# Patient Record
Sex: FEMALE | Race: OTHER
[De-identification: names, ages, dates, MRNs, and addresses within clinical notes are randomized per-mention and may not be internally consistent; named-entity substitution may affect disease eponyms.]

---

## 2017-04-24 ENCOUNTER — HOSPITAL ENCOUNTER (EMERGENCY)
Dept: HOSPITAL 74 - JERFT | Age: 22
Discharge: HOME | End: 2017-04-24
Payer: COMMERCIAL

## 2017-04-24 VITALS — DIASTOLIC BLOOD PRESSURE: 65 MMHG | TEMPERATURE: 98.1 F | SYSTOLIC BLOOD PRESSURE: 126 MMHG | HEART RATE: 67 BPM

## 2017-04-24 VITALS — BODY MASS INDEX: 37.9 KG/M2

## 2017-04-24 DIAGNOSIS — N93.8: Primary | ICD-10-CM

## 2017-04-24 LAB
APPEARANCE UR: CLEAR
BILIRUB UR STRIP.AUTO-MCNC: NEGATIVE MG/DL
COLOR UR: (no result)
KETONES UR QL STRIP: NEGATIVE
LEUKOCYTE ESTERASE UR QL STRIP.AUTO: NEGATIVE
NITRITE UR QL STRIP: NEGATIVE
PH UR: 8 [PH] (ref 5–8)
PROT UR QL STRIP: NEGATIVE
PROT UR QL STRIP: NEGATIVE
RBC # UR STRIP: NEGATIVE /UL
SP GR UR: 1.01 (ref 1–1.03)
UROBILINOGEN UR STRIP-MCNC: NEGATIVE E.U./DL (ref 0.2–1)

## 2017-04-24 NOTE — PDOC
History of Present Illness





- General


Chief Complaint: Pain


Stated Complaint: Abd cramps x2 weeks


Time Seen by Provider: 17 14:44


History Source: Patient


Exam Limitations: No Limitations





- History of Present Illness


Initial Comments: 


17 17:09














My Chief complaint: Lower  Abdominal cramping intermittently











History of present illness: Patient is a 22-year-old female with a history of 

ovarian cysts, ectopic pregnancy a , second trimester miscarriage at 2015, blanted ovum 2015 and one  here today of lower abdominal 

cramping that started in the beginning of the month lasting 3-4 days after her 

menstrual cycle  and reocurring 17 that are intermittent worse on the left 

side and slight lower back pain intermittently. Patient denies any urinary 

symptoms. Patient reports being sexually active however does not use condoms. 

Patient had been on birth control however has not taken for 2 months. Patient 

denies any fever, nausea, vomiting, or any vaginal discharge. Patient 

requesting pregnancy test. Denies any radiation of pain down the legs from back 

or any urinary incontinency or saddle anesthesia.





17 17:17





Timing/Duration: intermittent


Severity: moderate


Associated Symptoms: reports: denies symptoms





Past History





- Past Medical History


Allergies/Adverse Reactions: 


 Allergies











Allergy/AdvReac Type Severity Reaction Status Date / Time


 


No Known Allergies Allergy   Verified 17 14:10











Home Medications: 


Ambulatory Orders





No Home Medications 0 dose .ROUTE UTDICT 13 








Other medical history: ovarian cyst





- Reproductive History


 (#): 2


Para: 0


Therapeutic (s) & number: Yes





- Psycho/Social/Smoking Cessation Hx


Suicidal Ideation: No


Smoking Status: No


Smoking History: Current every day smoker


Number of Cigarettes Smoked Daily: 5


Information on smoking cessation initiated: Yes


'Breaking Loose' booklet given: 17


Hx Alcohol Use: Yes


Drug/Substance Use Hx: No


Substance Use Type: None





**Review of Systems





- Review of Systems


Able to Perform ROS?: Yes


Constitutional: No: Symptoms Reported


HEENTM: No: Symptoms Reported


Respiratory: No: Symptoms reported


Cardiac (ROS): No: Symptoms Reported


ABD/GI: Yes: Abdominal cramping (intermitently last 3-4 days ).  No: Constipated

, Diarrhea, Nausea


Musculoskeletal: Yes: Back Pain (minimal lower back pain )


Integumentary: No: Symptoms Reported


Neurological: No: Symptoms reported





*Physical Exam





- Vital Signs


 Last Vital Signs











Temp Pulse Resp BP Pulse Ox


 


 98.1 F   67   18   126/65   98 


 


 17 14:07  17 14:07  17 14:07  17 14:07  17 14:07














- Physical Exam


General Appearance: Yes: Appropriately Dressed


Respiratory/Chest: positive: Lungs Clear, Normal Breath Sounds.  negative: 

Chest Tender, Respiratory Distress


Cardiovascular: positive: Regular Rhythm, Regular Rate, S1, S2


Female Pelvic Exam: positive: normal external exam, cervical os closed, normal 

adnexa, normal size ovaries, vaginal bleeding.  negative: CMT, discharge, 

lesions, Bartholin mass, Scalene Gland, adnexal tenderness, uterus, Urethra


Gastrointestinal/Abdominal: positive: Normal Bowel Sounds, Soft, Tenderness (

minimal mid suprapubic, left sided ).  negative: Organomegaly, Distended, 

Guarding, Rebound, Hepatomegaly, Spleenomegaly


Musculoskeletal: positive: Normal Inspection.  negative: CVA Tenderness, CVA 

Tenderness (R), CVA Tenderness (L), Decreased Range of Motion, Vertebral 

Tenderness


Integumentary: positive: Normal Color


Neurologic: positive: Alert





Medical Decision Making





- Medical Decision Making


17 17:09





Patient is a 22-year-old female with a history of ovarian cysts, ectopic 

pregnancy a , second trimester miscarriage at , blanted ovum 2015 and one  here today of lower abdominal cramping that started in 

the beginning of the month lasting 3-4 days after her menstrual cycle  and 

reocurring 17 that are intermittent worse on the left side and slight 

lower back pain intermittently. Patient denies any urinary symptoms. Patient 

reports being sexually active however does not use condoms. Patient had been on 

birth control however has not taken for 2 months. Patient denies any fever, 

nausea, vomiting, or any vaginal discharge. Patient requesting pregnancy test.








intermittent lower abdominal cramping 


r/o pregnancy


r/o STD


unprotected sex


dysfunctional uterine bleeding 








PLAN: 


 uriine hcg negative 


serum HCG negative


chlamydia/gc amplification 





pt. does not want to be treated preventatively for Chlamydia/GC now 

















Laboratory Tests











  17





  15:10 16:20


 


Serum Pregnancy, Qual   Negative


 


Urine Color  Ltyellow 


 


Urine Appearance  Clear 


 


Urine pH  8.0  D 


 


Ur Specific Gravity  1.014 


 


Urine Protein  Negative 


 


Urine Glucose (UA)  Negative 


 


Urine Ketones  Negative 


 


Urine Blood  Negative 


 


Urine Nitrite  Negative 


 


Urine Bilirubin  Negative 


 


Urine Urobilinogen  Negative 


 


Ur Leukocyte Esterase  Negative 


 


Urine HCG, Qual  Negative 














17 17:12











17 12:41








*DC/Admit/Observation/Transfer


Diagnosis at time of Disposition: 


 Dysfunctional uterine bleeding





- Discharge Dispostion


Disposition: HOME


Condition at time of disposition: Stable





- Referrals


Referrals: 


Natasha Castañeda [Non Staff, Medical] - 





- Patient Instructions


Additional Instructions: 














Follow up With your gynecologist as soon as possible 








REturn to ER if symptoms worsen











We will call you if pending labs are positive and you will need additional 

treatment








Take Ibuprofen as needed as directed by 








Patient Voiced understanding of discharge instructions and all questions were 

answered

## 2017-08-01 ENCOUNTER — HOSPITAL ENCOUNTER (EMERGENCY)
Dept: HOSPITAL 74 - JER | Age: 22
Discharge: HOME | End: 2017-08-01
Payer: COMMERCIAL

## 2017-08-01 VITALS — HEART RATE: 62 BPM | DIASTOLIC BLOOD PRESSURE: 60 MMHG | SYSTOLIC BLOOD PRESSURE: 120 MMHG

## 2017-08-01 VITALS — BODY MASS INDEX: 38.9 KG/M2

## 2017-08-01 VITALS — TEMPERATURE: 98.3 F

## 2017-08-01 DIAGNOSIS — N83.201: Primary | ICD-10-CM

## 2017-08-01 LAB
ALBUMIN SERPL-MCNC: 3.7 G/DL (ref 3.4–5)
ALP SERPL-CCNC: 83 U/L (ref 45–117)
ALT SERPL-CCNC: 27 U/L (ref 12–78)
ANION GAP SERPL CALC-SCNC: 6 MMOL/L (ref 8–16)
APPEARANCE UR: CLEAR
AST SERPL-CCNC: 18 U/L (ref 15–37)
BASOPHILS # BLD: 0.8 % (ref 0–2)
BILIRUB SERPL-MCNC: 0.3 MG/DL (ref 0.2–1)
BILIRUB UR STRIP.AUTO-MCNC: NEGATIVE MG/DL
CALCIUM SERPL-MCNC: 9 MG/DL (ref 8.5–10.1)
CO2 SERPL-SCNC: 28 MMOL/L (ref 21–32)
COLOR UR: (no result)
CREAT SERPL-MCNC: 0.5 MG/DL (ref 0.55–1.02)
DEPRECATED RDW RBC AUTO: 13.6 % (ref 11.6–15.6)
EOSINOPHIL # BLD: 0.9 % (ref 0–4.5)
GLUCOSE SERPL-MCNC: 93 MG/DL (ref 74–106)
KETONES UR QL STRIP: NEGATIVE
LEUKOCYTE ESTERASE UR QL STRIP.AUTO: NEGATIVE
MCH RBC QN AUTO: 30.2 PG (ref 25.7–33.7)
MCHC RBC AUTO-ENTMCNC: 33.8 G/DL (ref 32–36)
MCV RBC: 89.4 FL (ref 80–96)
NEUTROPHILS # BLD: 61.9 % (ref 42.8–82.8)
NITRITE UR QL STRIP: NEGATIVE
PH UR: 7 [PH] (ref 5–8)
PLATELET # BLD AUTO: 312 K/MM3 (ref 134–434)
PMV BLD: 7.7 FL (ref 7.5–11.1)
PROT SERPL-MCNC: 7.1 G/DL (ref 6.4–8.2)
PROT UR QL STRIP: NEGATIVE
PROT UR QL STRIP: NEGATIVE
RBC # UR STRIP: NEGATIVE /UL
SP GR UR: 1.02 (ref 1–1.02)
UROBILINOGEN UR STRIP-MCNC: NEGATIVE MG/DL (ref 0.2–1)
WBC # BLD AUTO: 10.2 K/MM3 (ref 4–10)

## 2017-08-01 NOTE — PDOC
History of Present Illness





- General


Chief Complaint: Pain


Stated Complaint: PAIN


Time Seen by Provider: 17 12:18


History Source: Patient


Exam Limitations: No Limitations





- History of Present Illness


Initial Comments: 


CHIEF COMPLAINT:  21 y/o afebrile female,  (1 , 1 miscarriage at 20 

weeks and 1 ectopic pregnancy) c/o left pelvic pain. 





HISTORY OF PRESENT ILLNESS:  The patient states she has had spasmotic left 

pelvic pain for the past 1 week.  She thought she was going to get her period, 

which was supposed to come 4 days ago, but since that never came and the pain 

continues she is here to be evaluated.  She did take a home pregnancy test 4 

days ago which was negative.  She is concerned she could have another ectopic.  

She denies f/c, v/d, CP, SOB, back pain, hematuria, dysuria, vaginal bleeding, 

abnormal vaginal discharge. 





Vital signs on arrival are within normal limits





REVIEW OF SYSTEMS:


GENERAL/CONSTITUTIONAL: No fever/chills. No weakness. No weight change.


HEAD, EYES, EARS, NOSE AND THROAT: No change in vision. No ear pain or 

discharge. No sore throat.


CARDIOVASCULAR: No chest pain or shortness of breath.


RESPIRATORY: No cough, wheezing, or hemoptysis.


GASTROINTESTINAL: +left lower abdominal pain.  +nausea.  No vomiting, diarrhea, 

constipation. 


GENITOURINARY: No dysuria, frequency, or change in urination.  No vaginal 

bleeding. 


MUSCULOSKELETAL: No joint or muscle swelling or pain. No neck or back pain.


SKIN: No rash or easy bruising.


NEUROLOGIC: No headache, vertigo, loss of consciousness, or loss of sensation.





PHYSICAL EXAM:


GENERAL: The patient is awake, alert, and fully oriented, in no acute distress.

  She is very well appearing, ambulatory, in NAD or obvious discomfort. 


HEAD: Normal with no signs of trauma.


ENT: Pupils equal, round and reactive to light, extraocular movements intact, 

sclera anicteric, conjunctiva clear. Neck supple.


LUNGS: Clear to auscultation bilaterally. Normal excursion. No respiratory 

distress or use of accessory muscles.


CV: RRR, S1/S2, no MRG. Cap refill < 2 sec.


ABDOMEN: Soft, non-distended, very minimal TTP of left pelvic region.  No 

rebound, guarding or rigidity.  


BACK:  No CVA TTP b/l. 


EXTREMITIES: Normal range of motion, no edema.


NEUROLOGICAL: Normal speech, normal gait. CN II-XII grossly intact.


PSYCH: Normal mood, normal affect.


SKIN: Warm, dry, normal turgor, no rashes or lesions noted.











Past History





- Past Medical History


Allergies/Adverse Reactions: 


 Allergies











Allergy/AdvReac Type Severity Reaction Status Date / Time


 


No Known Allergies Allergy   Verified 17 12:06











Home Medications: 


Ambulatory Orders





No Home Medications 0 dose .ROUTE UTDICT 13 








Other medical history: Etopic pregnancy





- Reproductive History


 (#): 2


Para: 0


Therapeutic (s) & number: Yes





- Psycho/Social/Smoking Cessation Hx


Suicidal Ideation: No


Smoking Status: No


Smoking History: Current every day smoker


Have you smoked in the past 12 months: Yes


Number of Cigarettes Smoked Daily: 5


Information on smoking cessation initiated: No


'Breaking Loose' booklet given: 17


Hx Alcohol Use: No


Drug/Substance Use Hx: No


Substance Use Type: None





*Physical Exam





- Vital Signs


 Last Vital Signs











Temp Pulse Resp BP Pulse Ox


 


 98.3 F   64   18   121/66   99 


 


 17 12:01  17 12:01  17 12:01  17 12:01  17 12:01














ED Treatment Course





- LABORATORY


CBC & Chemistry Diagram: 


 17 12:39





 17 12:39





Medical Decision Making





- Medical Decision Making


A/P:  21 y/o female with left lower pelvic pain x 1 week with history of 

ectopic pregnancy.  Plan is as follows:





1. Labs


2. UA


3. Ultrasound





Ultrasound


IMPRESSION:  Complex right ovarian cyst. 








Labs unremarkable.


UA negative for UTI or pregnancy.





Patient given all of her results.  Suggested she take Motrin for pain at home 

if needed, call her OB/GYN for follow up within 1 week and return to the ER 

with any worsening or concerning symptoms. 





The patient verbalizes understanding of all instructions, has no further 

questions and is awaiting discharge.














*DC/Admit/Observation/Transfer


Diagnosis at time of Disposition: 


 Cyst of ovary, Pain in pelvis





- Discharge Dispostion


Disposition: HOME


Condition at time of disposition: Good





- Referrals


Referrals: 


Calin Flores MD [Staff Physician] - Call tomorrow





- Patient Instructions


Printed Discharge Instructions:  DI for Ovarian Cyst, DI for Pelvic Pain


Additional Instructions: 


Discharge Instructions:


-Your ultrasound showed a right ovarian cyst


-All of your labs and your urinalysis were negative


-Please follow up with your OB/GYN or Dr. Flores within 1 week


-You can take over the counter motrin for pain


-Return to the ER with any worsening or concerning symptoms








- Post Discharge Activity


Work/School Note:  Back to Work

## 2017-08-01 NOTE — PDOC
*Physical Exam





- Vital Signs


 Last Vital Signs











Temp Pulse Resp BP Pulse Ox


 


 98.3 F   64   18   121/66   99 


 


 08/01/17 12:01  08/01/17 12:01  08/01/17 12:01  08/01/17 12:01  08/01/17 12:01














ED Treatment Course





- LABORATORY


CBC & Chemistry Diagram: 


 08/01/17 12:39





 08/01/17 12:39





- ADDITIONAL ORDERS


Additional order review: 


 Laboratory  Results











  08/01/17 08/01/17





  12:39 12:39


 


Sodium   137


 


Potassium   4.9  D


 


Chloride   103


 


Carbon Dioxide   28


 


Anion Gap   6 L


 


BUN   9  D


 


Creatinine   0.5 L D


 


Creat Clearance w eGFR   > 60


 


Random Glucose   93


 


Calcium   9.0


 


Total Bilirubin   0.3  D


 


AST   18  D


 


ALT   27  D


 


Alkaline Phosphatase   83


 


Total Protein   7.1


 


Albumin   3.7


 


Beta HCG, Quant   < 1.0


 


Blood Type  O POSITIVE 


 


Antibody Screen  Negative 








 











  08/01/17





  12:39


 


RBC  4.34


 


MCV  89.4


 


MCHC  33.8


 


RDW  13.6


 


MPV  7.7


 


Neutrophils %  61.9


 


Lymphocytes %  29.7  D


 


Monocytes %  6.7


 


Eosinophils %  0.9  D


 


Basophils %  0.8














Medical Decision Making





- Medical Decision Making





08/01/17 13:41





Pt seen by the Advanced Practice Provider under my direct supervision


Ancillary studies reviewed





I agree with plan as outlined by the Advanced Practice Provider VICENTA Bains

## 2018-05-08 ENCOUNTER — HOSPITAL ENCOUNTER (EMERGENCY)
Dept: HOSPITAL 74 - FER | Age: 23
Discharge: HOME | End: 2018-05-08
Payer: COMMERCIAL

## 2018-05-08 VITALS — BODY MASS INDEX: 36.3 KG/M2

## 2018-05-08 VITALS — SYSTOLIC BLOOD PRESSURE: 120 MMHG | DIASTOLIC BLOOD PRESSURE: 70 MMHG | TEMPERATURE: 98.5 F | HEART RATE: 73 BPM

## 2018-05-08 DIAGNOSIS — A64: Primary | ICD-10-CM

## 2018-05-08 DIAGNOSIS — F17.210: ICD-10-CM

## 2018-05-08 LAB
AMORPH PHOS CRY URNS QL MICRO: (no result) /HPF
BACTERIA #/AREA URNS HPF: (no result) /HPF
COLOR UR: YELLOW
EPITH CASTS URNS QL MICRO: (no result) /HPF
HCG UR QL: NEGATIVE
LEUKOCYTE ESTERASE UR QL STRIP.AUTO: (no result)
PH UR: 6 [PH] (ref 4.5–8)
RBC # BLD AUTO: (no result) /HPF (ref 0–3)
SP GR UR: 1.02 (ref 1–1.02)
UROBILINOGEN UR STRIP-MCNC: 0.2 MG/DL (ref 0.2–1)
WBC # UR AUTO: (no result) /UL (ref 0–5)

## 2018-05-08 NOTE — PDOC
History of Present Illness





- General


Chief Complaint: Pain, Acute


Stated Complaint: ABDOMINAL CRAMPING,VAGINAL DISCHARGE


Time Seen by Provider: 18 22:23


History Source: Patient


Exam Limitations: No Limitations





- History of Present Illness


Initial Comments: 





18 22:51


This is a 23-year-old female who comes in complaining of the left lower 

quadrant abdominal pain with vaginal discharge. Patient has a history 

significant for STDs in the past with similar symptoms. Patient also has a 

history of ovarian cyst and says she's had similar pains with the ovarian cyst 

but never the discharge. Patient describes it discharge is a yellowish bad 

odored discharge. Patient denies any fevers or chills. Patient is  4 

para 0 history significant for 2  one ectopic pregnancy 1 second 

trimester loss.





PAST MEDICAL HISTORY:  no significant history





PAST SURGICAL HISTORY:  no significant history





FAMILY HISTORY:  no pertinant history





SOCIAL HISTORY:  Pt lives with  family and is employed.





MEDICATIONS:  reviewed





ALLERGIES:  As per nursing notes





Review of Systems


General:  No fevers or chills, no weakness, no weight loss 


HEENT: No change in vision.  No sore throat,. No ear pain


CardioVascular:  No chest pain or shortness of breath


Respiratory:No cough, or wheezing. 


Gastrointestinal:  no nausea, vomitting, diarrhea or constipation,  No rectal 

bleeding


Genitourinary:  No dysuria, hematuria, or frequency, vaginal discharge with 

left lower quadrant pain


Musculoskeletal:  No joint or muscle pain or swelling


Neurologic: No headache, vertigo, dizziness or loss of consciousness


Psychiatric: nor depression 


Skin: No rashes or easy bruising


Endocrine: no increased thirst or abnormal weight change


Allergic: no skin or latex allergy


All other systems reviewed and normal








Exam:





General: Well-nourished well-developed individual, no acute distress


HEENT: Throat: Normal, tonsils normal, no erythema or exudate


               Neck: Supple, no meningeal signs, no lymphadenopathy


Eyes::Pupils equal reactive and round, extraocular motion intact


Chest: Nontender to palpation 


Cardiac: S1-S2 normal, regular rate and rhythm, no murmurs rubs or gallops


Respiratory: Lungs clear to auscultation bilateral


Abdomen: Soft, nondistended, normal bowel sounds, moderately tender to 

palpation left adnexal area


: There is a yellowish discharge with foul odor, there is cervical motion 

tenderness with left adnexal tenderness


Extremities: Warm, dry, no cyanosis, clubbing, or edema


Skin: No rashes


Neuro: Alert and oriented x3, CN II - XII intact, nonfocal exam with normal 

strength, normal sensation, normal reflexes, normal gait, 


Psych: Normal mood and affect





Assessment and plan: This is a 23-year-old female with vaginal discharge and 

odor and left adnexal tenderness. Patient's consistent with an STD. Patient had 

a VDRL sent and was given a shot of ceftriaxone and azithromycin will be fully 

treated for chlamydia and gonorrhea. Discussed with patient the importance of 

no sexual intercourse until her partners treated or the results of her cultures 

are known and are negative. Patient does have an OB to follow up with. 











Past History





- Past Medical History


Allergies/Adverse Reactions: 


 Allergies











Allergy/AdvReac Type Severity Reaction Status Date / Time


 


No Known Allergies Allergy   Verified 18 22:22











Home Medications: 


Ambulatory Orders





No Home Medications 0 dose .ROUTE UTDICT 13 











- Reproductive History


 (#): 2


Para: 0


Therapeutic (s) & number: Yes





- Suicide/Smoking/Psychosocial Hx


Smoking Status: No


Smoking History: Current every day smoker


Have you smoked in the past 12 months: Yes


Number of Cigarettes Smoked Daily: 5


'Breaking Loose' booklet given: 17


Hx Alcohol Use: No


Drug/Substance Use Hx: No


Substance Use Type: None





*DC/Admit/Observation/Transfer


Diagnosis at time of Disposition: 


 Sexually transmitted disease (STD)








- Discharge Dispostion


Disposition: HOME


Condition at time of disposition: Stable


Decision to Admit order: No





- Referrals





- Patient Instructions


Printed Discharge Instructions:  Facts About Sexually Transmitted Infections


Additional Instructions: 


You have been fully retreated for gonorrhea and chlamydia as they are the most 

likely organisms that are causing her symptoms..





A test was also sent for syphilis.





No sexual intercourse until you know the results here culture and if any are 

positive your partner will need to be treated as well prior to resuming sexual 

intercourse.





Return to the emergency department immediately with ANY new, persistent or 

worsening symptoms.





Continue any medications as previously prescribed by your physician.





You should follow up with your primary doctor as soon as possible regarding 

today's emergency department visit.


.


Please make sure your doctor reviews the results of your emergency evaluation.





Thank you for coming to the   Emergency Department today for your care. It was 

a pleasure to see you today. Please note that your evaluation is INCOMPLETE 

until you  follow-up with your doctor. 





 





- Post Discharge Activity

## 2018-11-12 ENCOUNTER — HOSPITAL ENCOUNTER (EMERGENCY)
Dept: HOSPITAL 74 - JERFT | Age: 23
Discharge: HOME | End: 2018-11-12
Payer: COMMERCIAL

## 2018-11-12 VITALS — BODY MASS INDEX: 30.9 KG/M2

## 2018-11-12 VITALS — DIASTOLIC BLOOD PRESSURE: 70 MMHG | TEMPERATURE: 98.5 F | SYSTOLIC BLOOD PRESSURE: 109 MMHG | HEART RATE: 62 BPM
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
## 2018-11-12 DIAGNOSIS — S39.012A: Primary | ICD-10-CM

## 2018-11-12 NOTE — PDOC
History of Present Illness





- General


Chief Complaint: Back Pain


Stated Complaint: BACK PAIN


Time Seen by Provider: 18 14:01





- History of Present Illness


Initial Comments: 





18 14:06


23-year-old female without comorbidities presents for evaluation of 8 medical 

onset of lower back pain without radicular symptoms times one week. No loss of 

bowel bladder function or saddle paresthesias. No systemic symptoms





Past History





- Past Medical History


Allergies/Adverse Reactions: 


 Allergies











Allergy/AdvReac Type Severity Reaction Status Date / Time


 


No Known Allergies Allergy   Verified 18 13:38











Home Medications: 


Ambulatory Orders





No Home Medications 0 dose .ROUTE UTDICT 13 








COPD: No





- Reproductive History


 (#): 2


Para: 0


Therapeutic (s) & number: Yes





- Suicide/Smoking/Psychosocial Hx


Smoking Status: No


Smoking History: Current every day smoker


Have you smoked in the past 12 months: Yes


Number of Cigarettes Smoked Daily: 5


Information on smoking cessation initiated: No


'Breaking Loose' booklet given: 17


Hx Alcohol Use: No


Drug/Substance Use Hx: No


Substance Use Type: None





**Review of Systems





- Review of Systems


Constitutional: No: Chills, Diaphoresis, Fever, Malaise, Night Sweats


Musculoskeletal: Yes: Back Pain





*Physical Exam





- Vital Signs


 Last Vital Signs











Temp Pulse Resp BP Pulse Ox


 


 98.5 F   62   18   109/70   99 


 


 18 13:39  18 13:39  18 13:39  18 13:39  18 13:39














- Physical Exam


Comments: 





18 14:07


Lumbar spine skin color and temperature are normal. There is mild right-sided 

paralumbar musculature tenderness. No palpable spasm. 5 out of 5 strength in 

bilateral lower extremities without gross sensorimotor deficits. Negative 

straight leg raise test bilaterally thighs and calves are soft and nontender. 

She's neurovascularly intact.





Medical Decision Making





- Medical Decision Making





18 14:07


Lumbar spine strain. Tylenol for pain as directed follow-up with spine surgery 

for further evaluation and treatment options.





*DC/Admit/Observation/Transfer


Diagnosis at time of Disposition: 


 Lumbar spine strain








- Discharge Dispostion


Disposition: HOME


Condition at time of disposition: Stable


Decision to Admit order: No





- Referrals


Referrals: 


Ike Augustine MD [Staff Physician] - 





- Patient Instructions


Printed Discharge Instructions:  Low Back Pain, DI for Low Back Pain


Additional Instructions: 


He may take Tylenol for pain as directed. Return to the emergency room should 

symptoms worsen or go unresolved. Follow-up with spine surgery in 2-3 days for 

further evaluation and treatment options.





- Post Discharge Activity

## 2019-02-11 ENCOUNTER — HOSPITAL ENCOUNTER (EMERGENCY)
Dept: HOSPITAL 74 - JER | Age: 24
LOS: 1 days | Discharge: HOME | End: 2019-02-12
Payer: COMMERCIAL

## 2019-02-11 VITALS — DIASTOLIC BLOOD PRESSURE: 62 MMHG | HEART RATE: 73 BPM | SYSTOLIC BLOOD PRESSURE: 124 MMHG | TEMPERATURE: 98.4 F

## 2019-02-11 VITALS — BODY MASS INDEX: 38 KG/M2

## 2019-02-11 DIAGNOSIS — N83.291: ICD-10-CM

## 2019-02-11 DIAGNOSIS — O26.891: Primary | ICD-10-CM

## 2019-02-11 DIAGNOSIS — O34.81: ICD-10-CM

## 2019-02-11 DIAGNOSIS — R10.30: ICD-10-CM

## 2019-02-11 DIAGNOSIS — N83.292: ICD-10-CM

## 2019-02-11 DIAGNOSIS — Z3A.01: ICD-10-CM

## 2019-02-11 LAB
ANION GAP SERPL CALC-SCNC: 5 MMOL/L (ref 8–16)
APPEARANCE UR: CLEAR
BASOPHILS # BLD: 0.7 % (ref 0–2)
BILIRUB UR STRIP.AUTO-MCNC: NEGATIVE MG/DL
BUN SERPL-MCNC: 6 MG/DL (ref 7–18)
CALCIUM SERPL-MCNC: 9 MG/DL (ref 8.5–10.1)
CHLORIDE SERPL-SCNC: 108 MMOL/L (ref 98–107)
CO2 SERPL-SCNC: 25 MMOL/L (ref 21–32)
COLOR UR: (no result)
CREAT SERPL-MCNC: 0.5 MG/DL (ref 0.55–1.3)
DEPRECATED RDW RBC AUTO: 13.5 % (ref 11.6–15.6)
EOSINOPHIL # BLD: 0.6 % (ref 0–4.5)
EPITH CASTS URNS QL MICRO: (no result) /HPF
GLUCOSE SERPL-MCNC: 78 MG/DL (ref 74–106)
HCT VFR BLD CALC: 38.9 % (ref 32.4–45.2)
HGB BLD-MCNC: 13.4 GM/DL (ref 10.7–15.3)
KETONES UR QL STRIP: (no result)
LEUKOCYTE ESTERASE UR QL STRIP.AUTO: NEGATIVE
LYMPHOCYTES # BLD: 25.9 % (ref 8–40)
MCH RBC QN AUTO: 31.6 PG (ref 25.7–33.7)
MCHC RBC AUTO-ENTMCNC: 34.3 G/DL (ref 32–36)
MCV RBC: 92 FL (ref 80–96)
MONOCYTES # BLD AUTO: 4.8 % (ref 3.8–10.2)
NEUTROPHILS # BLD: 68 % (ref 42.8–82.8)
NITRITE UR QL STRIP: NEGATIVE
PH UR: 6 [PH] (ref 5–8)
PLATELET # BLD AUTO: 280 K/MM3 (ref 134–434)
PMV BLD: 8 FL (ref 7.5–11.1)
POTASSIUM SERPLBLD-SCNC: 5.3 MMOL/L (ref 3.5–5.1)
PROT UR QL STRIP: NEGATIVE
PROT UR QL STRIP: NEGATIVE
RBC # BLD AUTO: 4.23 M/MM3 (ref 3.6–5.2)
SODIUM SERPL-SCNC: 138 MMOL/L (ref 136–145)
SP GR UR: 1.01 (ref 1.01–1.03)
UROBILINOGEN UR STRIP-MCNC: NEGATIVE MG/DL (ref 0.2–1)
WBC # BLD AUTO: 15.8 K/MM3 (ref 4–10)

## 2019-02-11 NOTE — PDOC
*Physical Exam





- Vital Signs


 Last Vital Signs











Temp Pulse Resp BP Pulse Ox


 


 98.4 F   73   18   124/62   99 


 


 02/11/19 19:49  02/11/19 19:49  02/11/19 19:49  02/11/19 19:49  02/11/19 19:49














ED Treatment Course





- LABORATORY


CBC & Chemistry Diagram: 


 02/11/19 20:44





 02/11/19 20:43





- ADDITIONAL ORDERS


Additional order review: 


 Laboratory  Results











  02/11/19 02/11/19 02/11/19





  20:47 20:43 20:43


 


Sodium    138


 


Potassium    5.3 H


 


Chloride    108 H


 


Carbon Dioxide    25


 


Anion Gap    5 L


 


BUN    6 L


 


Creatinine    0.5 L


 


Creat Clearance w eGFR    > 60


 


Random Glucose    78


 


Calcium    9.0


 


Beta HCG, Quant    321.6


 


Urine Color  Straw  


 


Urine Appearance  Clear  


 


Urine pH  6.0  


 


Ur Specific Gravity  1.008 L  


 


Urine Protein  Negative  


 


Urine Glucose (UA)  Negative  


 


Urine Ketones  Trace H  


 


Urine Blood  1+ H  


 


Urine Nitrite  Negative  


 


Urine Bilirubin  Negative  


 


Urine Urobilinogen  Negative  


 


Ur Leukocyte Esterase  Negative  


 


Urine WBC (Auto)  None  


 


Urine RBC (Auto)  1  


 


Ur Epithelial Cells  Rare  


 


Blood Type   Cancelled 


 


Antibody Screen   Cancelled 








 











  02/11/19





  20:44


 


RBC  4.23


 


MCV  92.0


 


MCHC  34.3


 


RDW  13.5


 


MPV  8.0


 


Neutrophils %  68.0


 


Lymphocytes %  25.9


 


Monocytes %  4.8


 


Eosinophils %  0.6


 


Basophils %  0.7














- Medications


Given in the ED: 


ED Medications














Discontinued Medications














Generic Name Dose Route Start Last Admin





  Trade Name Freq  PRN Reason Stop Dose Admin


 


Acetaminophen  650 mg  02/11/19 19:51  02/11/19 20:47





  Tylenol -  PO  02/11/19 19:52  650 mg





  ONCE ONE   Administration





     





     





     





     














Medical Decision Making





- Medical Decision Making





02/11/19 22:57


Patient seen by the advanced practice provider under my direct supervision. 

Ancillary testing reviewed as necessary.


I agree with plan as outlined by the advanced practice provider.





*DC/Admit/Observation/Transfer


Diagnosis at time of Disposition: 


 Abdominal pain affecting pregnancy








- Discharge Dispostion


Disposition: HOME


Condition at time of disposition: Stable





- Referrals


Referrals: 


Alfa Monet MD [Staff Physician] - 


Gomez Helton MD, MD [Primary Care Provider] - 





- Patient Instructions


Additional Instructions: 


You've been given her results for your ultrasound and laboratory testing. 

Please bring these to Dr. Monet's office on Thursday for reevaluation.


Ultrasound did not show an intrauterine pregnancy. This may be due to early 

pregnancy or you may have a missed placed pregnancy. This is why you need 

evaluation with her gynecologist.


Your beta hCG is 321.6.


Return to emergency department sooner for worsening pain, bleeding requiring 

more than 2 pads per hour or for any other new or worsening symptoms.





- Post Discharge Activity

## 2019-02-11 NOTE — PDOC
History of Present Illness





- General


Chief Complaint: Pain


Stated Complaint: PELVIC PAIN (POSSIBLE PREGNANT)


Time Seen by Provider: 19 22:09


History Source: Patient, Old Records


Exam Limitations: No Limitations





- History of Present Illness


Travel History: No


Initial Comments: 





19 23:13





HISTORY OF PRESENT ILLNESS: This is a 24-year-old  with history of ectopic 

pregnancy presents emergency department for evaluation of lower abdominal pain 

and vaginal spotting in the setting of a positive home pregnancy test. Patient 

was concerned with history of an ectopic pregnancy that she may be having 

another ectopic pregnancy. Patient reports she does have a GYN in the [place] but 

has not called to schedule an appointment. Patient reports an LMP 1/15/18.





No recent travel or sick contacts. 


PAST MEDICAL HISTORY: see hpi


SURGICAL HISTORY: Denies


ALLERGIES: No known drug allergies





REVIEW OF SYSTEMS


General/Constitutional: Denies fever or chills. Denies weakness, weight change.


HEENT: Denies change in vision. Denies ear pain or discharge. Denies sore 

throat.


Cardiovascular: Denies chest pain or shortness of breath.


Respiratory: Denies cough, wheezing, or hemoptysis.


Gastrointestinal: Denies nausea, vomiting, diarrhea or constipation. Denies 

rectal bleeding.


Genitourinary: Denies dysuria, frequency, or change in urination.


Gyn: see HPI


Musculoskeletal: Denies joint or muscle swelling or pain. Denies neck or back 

pain.


Skin and breasts: Denies rash or easy bruising.


Neurologic: Denies headache, vertigo, loss of consciousness, or loss of 

sensation.


Psychiatric: Denies depression or anxiety.


Endocrine: Denies increased thirst. Denies abnormal weight change.


Hematologic/Lymphatic: Denies anemia, easy bleeding, or history of blood clots.


Allergic/Immunologic: Denies hives or skin allergy. Denies latex allergy.











PHYSICAL EXAM


General Appearance: Well-appearing, appropriately dressed.  No apparent distress

, no intoxication.


HEENT: EOMI, PERRLA, normal ENT inspection, normal voice, TMs normal, pharynx 

normal.  No conjunctival pallor.  No photophobia, scleral icterus.


Neck: Supple.  Trachea midline. No tenderness, rigidity, carotid bruit, stridor

, lymphadenopathy, or thyromegaly. 


Respiratory/Chest: Lungs CTAB.  No shortness of breath, chest tenderness, 

respiratory distress, accessory muscle use. No crackles, rales, rhonchi, stridor

, wheezing, dullness


Cardiovascular: RRR. S1, S2.  No JVD, murmur, bradycardia, tachycardia.


Vascular Pulses: Dorsalis-Pedis (R): 2+, Dorsalis-Pedis (L): 2+


Gastrointestinal/Abdominal: Normal bowel sounds. Abdomen soft, non-distended.  

No tenderness or rebound tenderness. No organomegaly, pulsatile mass, guarding, 

hernia, hepatomegaly, splenomegaly.


Lymphatic: No adenopathy, tenderness.


Musculoskeletal/Extremities:  Normal inspection. FROM of all extremities, 

normal capillary refill.  Pelvis Stable.  No CVA tenderness. No tenderness to 

extremities, pedal edema, swelling, erythema or deformity.


Integumentary: Appropriate color, dry, warm.  No cyanosis, erythema, jaundice 

or rash


Neurologic: CNs II-XII intact. Fully oriented, alert.  Appropriate mood/affect. 

Motor strength 5/5.  No appreciable EOM palsy, facial droop or sensory deficit.








Past History





- Past Medical History


Allergies/Adverse Reactions: 


 Allergies











Allergy/AdvReac Type Severity Reaction Status Date / Time


 


No Known Allergies Allergy   Verified 19 19:47











Home Medications: 


Ambulatory Orders





No Home Medications 0 dose .ROUTE UTDICT 13 








COPD: No





- Reproductive History


 (#): 2


Para: 0


Therapeutic (s) & number: Yes





- Suicide/Smoking/Psychosocial Hx


Smoking Status: No


Smoking History: Current every day smoker


Have you smoked in the past 12 months: Yes


Number of Cigarettes Smoked Daily: 5


Information on smoking cessation initiated: No


'Breaking Loose' booklet given: 17


Hx Alcohol Use: No


Drug/Substance Use Hx: No


Substance Use Type: None





*Physical Exam





- Vital Signs


 Last Vital Signs











Temp Pulse Resp BP Pulse Ox


 


 98.4 F   73   18   124/62   99 


 


 19 19:49  19 19:49  19 19:49  19 19:49  19 19:49














Moderate Sedation





- Procedure Monitoring


Vital Signs: 


Procedure Monitoring Vital Signs











Temperature  98.4 F   19 19:49


 


Pulse Rate  73   19 19:49


 


Respiratory Rate  18   19 19:49


 


Blood Pressure  124/62   19 19:49


 


O2 Sat by Pulse Oximetry (%)  99   02/11/19 19:49











ED Treatment Course





- LABORATORY


CBC & Chemistry Diagram: 


 19 20:44





 19 20:43





- ADDITIONAL ORDERS


Additional order review: 


 Laboratory  Results











  19





  20:47 20:43 20:43


 


Sodium    138


 


Potassium    5.3 H


 


Chloride    108 H


 


Carbon Dioxide    25


 


Anion Gap    5 L


 


BUN    6 L


 


Creatinine    0.5 L


 


Creat Clearance w eGFR    > 60


 


Random Glucose    78


 


Calcium    9.0


 


Beta HCG, Quant    321.6


 


Urine Color  Straw  


 


Urine Appearance  Clear  


 


Urine pH  6.0  


 


Ur Specific Gravity  1.008 L  


 


Urine Protein  Negative  


 


Urine Glucose (UA)  Negative  


 


Urine Ketones  Trace H  


 


Urine Blood  1+ H  


 


Urine Nitrite  Negative  


 


Urine Bilirubin  Negative  


 


Urine Urobilinogen  Negative  


 


Ur Leukocyte Esterase  Negative  


 


Urine WBC (Auto)  None  


 


Urine RBC (Auto)  1  


 


Ur Epithelial Cells  Rare  


 


Blood Type   Cancelled 


 


Antibody Screen   Cancelled 








 











  19





  20:44


 


RBC  4.23


 


MCV  92.0


 


MCHC  34.3


 


RDW  13.5


 


MPV  8.0


 


Neutrophils %  68.0


 


Lymphocytes %  25.9


 


Monocytes %  4.8


 


Eosinophils %  0.6


 


Basophils %  0.7














- Medications


Given in the ED: 


ED Medications














Discontinued Medications














Generic Name Dose Route Start Last Admin





  Trade Name Freq  PRN Reason Stop Dose Admin


 


Acetaminophen  650 mg  19 19:51  19 20:47





  Tylenol -  PO  19 19:52  650 mg





  ONCE ONE   Administration





     





     





     





     














Medical Decision Making





- Medical Decision Making





19 23:14


A/P:


25yo  with lower abd pain and spotting





External exam is unremarkable.


Cervical os closed


No blood in vault


No adnexal masses or tenderness





Transvaginal ultrasound as read by Dr. Ratliff: Borderline thickening of the 

endometrial stripe without evidence of intrauterine gestational sac.


Moderate amount of complex fluid in the cul-de-sac suggestive of hemorrhage.


Large right ovary with multiple simple cysts the largest measuring 3.2 x 2.2 cm


Large left ovary with complex cyst measuring 3.3 x 2.5 cm that may represent a 

hemorrhagic cyst. Normal vascular flow in both ovaries.


Correlate clinically to determine further evaluation and follow-up.





Case discussed with Dr. Monet the on-call GYN doctor who recommends 2 day 

follow-up in his clinic on Thursday. No further intervention is needed at this 

time.





T&S


19 01:08


Patient with O+ blood type. We'll discharge patient home to follow-up with GYN 

2 days as recommended by Dr. Monet.





*DC/Admit/Observation/Transfer


Diagnosis at time of Disposition: 


 Abdominal pain affecting pregnancy








- Discharge Dispostion


Disposition: HOME


Condition at time of disposition: Stable


Decision to Admit order: No





- Referrals


Referrals: 


Gomez Helton MD, MD [Primary Care Provider] - 


Alfa Monet MD [Staff Physician] - 





- Patient Instructions


Additional Instructions: 


You've been given her results for your ultrasound and laboratory testing. 

Please bring these to Dr. Monet's office on Thursday for reevaluation.


Ultrasound did not show an intrauterine pregnancy. This may be due to early 

pregnancy or you may have a missed placed pregnancy. This is why you need 

evaluation with her gynecologist.


Your beta hCG is 321.6.


Return to emergency department sooner for worsening pain, bleeding requiring 

more than 2 pads per hour or for any other new or worsening symptoms.





- Post Discharge Activity

## 2019-04-24 ENCOUNTER — HOSPITAL ENCOUNTER (EMERGENCY)
Dept: HOSPITAL 74 - FER | Age: 24
Discharge: HOME | End: 2019-04-24
Payer: COMMERCIAL

## 2019-04-24 VITALS — HEART RATE: 84 BPM | SYSTOLIC BLOOD PRESSURE: 125 MMHG | DIASTOLIC BLOOD PRESSURE: 55 MMHG | TEMPERATURE: 98.5 F

## 2019-04-24 VITALS — BODY MASS INDEX: 35.9 KG/M2

## 2019-04-24 DIAGNOSIS — J20.9: Primary | ICD-10-CM

## 2019-04-24 DIAGNOSIS — F17.210: ICD-10-CM

## 2019-04-24 NOTE — PDOC
History of Present Illness





- General


Chief Complaint: Respiratory


Stated Complaint: fever,cough,congestion,chest and body pain


Time Seen by Provider: 19 13:32





- History of Present Illness


Initial Comments: 





19 16:01


Chief complaint: Productive cough and fever for 2 days





History of present illness: Patient complains of productive cough, yellowish 

sputum, subjective fever, beginning yesterday. Also nasal congestion, postnasal 

drip, fatigue, malaise.





Review of systems: Denies chest pain, abdominal pain, although she states she 

had nausea and 2 episodes of vomiting this morning. This was after coughing.


Menses regular. No urinary tract symptoms. Remainder systems reviewed and 

negative





Past medical history: Denies asthma. Denies other significant medical or 

surgical illness past her present





Social/family history: Heavy smoker. Occasional social alcohol. No drugs.





Physical exam: Alert and oriented well-developed well-nourished no acute 

distress cooperative


Afebrile, vital signs normal


HEENT: Nasal congestion, watery nasal discharge, ears and throat clear


Neck supple without bruit mass or nodes


Chest clear to P&A


CV regular without murmur rub or gallop


Abdomen benign


Skin clear, no rash, adequate turgor and wet mucous membranes





Impression: Heavy smoker with symptoms of URI and bronchitis





Plan: Antibiotic, expectorant and cough suppressant, rest and fluids, follow-up 

2 days primary physician. Fully ambulatory in no distress upon discharge.











Past History





- Past Medical History


Allergies/Adverse Reactions: 


 Allergies











Allergy/AdvReac Type Severity Reaction Status Date / Time


 


No Known Allergies Allergy   Verified 19 13:32











Home Medications: 


Ambulatory Orders





No Home Medications 0 dose .ROUTE UTDICT 13 


Amoxicillin - [Amoxicillin 250mg Capsule -] 250 mg PO TID #21 capsule 19 


Promethazine/Phenyleph/Codeine [Promethazine-PE-Codeine Syrup] 5 - 10 ml PO TID 

PRN #120 ml MDD 8 19 








COPD: No





- Reproductive History


 (#): 2


Para: 0


Therapeutic (s) & number: Yes





- Suicide/Smoking/Psychosocial Hx


Smoking Status: No


Smoking History: Current every day smoker


Have you smoked in the past 12 months: Yes


Number of Cigarettes Smoked Daily: 10


Information on smoking cessation initiated: Yes


'Breaking Loose' booklet given: 17


Hx Alcohol Use: No


Drug/Substance Use Hx: No


Substance Use Type: None





*Physical Exam





- Vital Signs


 Last Vital Signs











Temp Pulse Resp BP Pulse Ox


 


 98.5 F   84   16   125/55 L  100 


 


 19 13:31  19 13:31  19 13:31  19 13:31  19 13:31














*DC/Admit/Observation/Transfer


Diagnosis at time of Disposition: 


Acute bronchitis


Qualifiers:


 Bronchitis organism: unspecified organism Qualified Code(s): J20.9 - Acute 

bronchitis, unspecified








- Discharge Dispostion


Disposition: HOME


Condition at time of disposition: Stable


Decision to Admit order: No





- Prescriptions


Prescriptions: 


Amoxicillin - [Amoxicillin 250mg Capsule -] 250 mg PO TID #21 capsule


Promethazine/Phenyleph/Codeine [Promethazine-PE-Codeine Syrup] 5 - 10 ml PO TID 

PRN #120 ml MDD 8


 PRN Reason: Cough





- Referrals


Referrals: 


Sunday Wright MD [Staff Physician] - 





- Patient Instructions


Printed Discharge Instructions:  [facility] for Acute Bronchitis





- Post Discharge Activity


Forms/Work/School Notes:  Back to Work

## 2020-01-06 ENCOUNTER — HOSPITAL ENCOUNTER (EMERGENCY)
Dept: HOSPITAL 74 - JER | Age: 25
Discharge: HOME | End: 2020-01-06
Payer: COMMERCIAL

## 2020-01-06 VITALS — BODY MASS INDEX: 34.9 KG/M2

## 2020-01-06 VITALS — SYSTOLIC BLOOD PRESSURE: 118 MMHG | DIASTOLIC BLOOD PRESSURE: 70 MMHG

## 2020-01-06 VITALS — HEART RATE: 67 BPM | TEMPERATURE: 98.4 F

## 2020-01-06 DIAGNOSIS — Z3A.00: ICD-10-CM

## 2020-01-06 DIAGNOSIS — O26.891: Primary | ICD-10-CM

## 2020-01-06 DIAGNOSIS — O00.80: ICD-10-CM

## 2020-01-06 PROCEDURE — 3E0233Z INTRODUCTION OF ANTI-INFLAMMATORY INTO MUSCLE, PERCUTANEOUS APPROACH: ICD-10-PCS | Performed by: STUDENT IN AN ORGANIZED HEALTH CARE EDUCATION/TRAINING PROGRAM

## 2020-01-06 NOTE — PDOC
History of Present Illness





- General


Chief Complaint: [facility]


Stated Complaint: REVISIT


Time Seen by Provider: 20 16:14


History Source: Patient


Exam Limitations: No Limitations





- History of Present Illness


Travel History: No


Initial Comments: 





20 18:03


24-year-old female seen here 2 days prior for ago and was told to return here 

in 2 days for repeat beta-hCG and ultrasound due to mass seen in the left 

adnexa.  Patient with history of ectopic pregnancy x2.  Patient currently 

denies worsening pain, heavy vaginal bleeding, nausea, fever, and vomiting. 


Timing/Duration: reports: intermittent


Quality: reports: mild, cramping


Abdominal Pain Onset Location: reports: suprapubic (left)


Pain Radiation: reports: no radiation


Activities at Onset: reports: eating


Aggravating Factors: improves with: None


Alleviating Factors: improves with: None





Past History





- Travel


Traveled outside of the country in the last 30 days: No


Close contact w/someone who was outside of country & ill: No





- Past Medical History


Allergies/Adverse Reactions: 


 Allergies











Allergy/AdvReac Type Severity Reaction Status Date / Time


 


No Known Allergies Allergy   Verified 20 16:16











Home Medications: 


Ambulatory Orders





No Home Medications 0 dose .ROUTE UTDICT 13 


Amoxicillin - [Amoxicillin 250mg Capsule -] 250 mg PO TID #21 capsule 19 


Promethazine/Phenyleph/Codeine [Promethazine-PE-Codeine Syrup] 5 - 10 ml PO TID 

PRN #120 ml MDD 8 19 








COPD: No





- Reproductive History


 (#): 2


Para: 0


Therapeutic (s) & number: Yes





- Immunization History


Immunization Up to Date: Yes





- Psycho Social/Smoking Cessation Hx


Smoking Status: No


Smoking History: Current every day smoker


Have you smoked in the past 12 months: Yes


Number of Cigarettes Smoked Daily: 5


Information on smoking cessation initiated: No


'Breaking Loose' booklet given: 17


Hx Alcohol Use: No


Drug/Substance Use Hx: No


Substance Use Type: None


Patient Lives Alone: No


Lives with/in: parents





**Review of Systems





- Review of Systems


Able to Perform ROS?: No


Is the patient limited English proficient: No


Constitutional: No: Symptoms Reported


HEENTM: No: Symptoms Reported


Respiratory: No: Symptoms reported


Cardiac (ROS): No: Symptoms Reported


ABD/GI: Yes: Abdominal cramping.  No: Nausea, Vomiting


: No: Symptoms Reported


Musculoskeletal: No: Symptoms Reported


Integumentary: No: Symptoms Reported


Neurological: No: Symptoms reported





*Physical Exam





- Vital Signs


 Last Vital Signs











Temp Pulse Resp BP Pulse Ox


 


 98.4 F   67   16   115/36 L  98 


 


 20 16:14  20 16:14  20 16:14  20 16:14  20 16:14














- Physical Exam


General Appearance: Yes: Nourished, Appropriately Dressed.  No: Apparent 

Distress


HEENT: negative: Pale Conjunctivae


Neck: positive: Normal Thyroid


Respiratory/Chest: positive: Lungs Clear, Normal Breath Sounds.  negative: 

Respiratory Distress, Accessory Muscle Use


Cardiovascular: positive: Regular Rhythm, Regular Rate.  negative: Murmur


Female Pelvic Exam: positive: vaginal bleeding (spotting bright red without 

clots)


Gastrointestinal/Abdominal: positive: Soft, Tenderness (left suprapubic).  

negative: Distended


Musculoskeletal: negative: CVA Tenderness


Integumentary: positive: Normal Color, Warm, Moist


Neurologic: positive: Motor Strength 5/5 (ambulatory)





ED Treatment Course





- ADDITIONAL ORDERS


Additional order review: 


 Laboratory  Results











  20





  16:27


 


Beta HCG, Quant  304.6














Medical Decision Making





- Medical Decision Making





20 18:07


Chief complaint: Patient here for repeat beta-hCG and ultrasound.  Patient with 

history of left ectopic x2 last episode being 2019 requiring 

methotrexate.  Patient denies worsening pain.  Patient does not have an OB/GYN.


Exam: Patient with left suprapubic tenderness spotting bright red blood without 

clots. vss


Plan:Ultrasound and beta hCG will consult GYN on call If needed


20 18:48


 Laboratory Tests











  20





  16:00 16:27


 


Beta HCG, Quant  193.8  304.6











20 18:50


Ultrasound shows no  intrauterine pregnancy.  As on prior exam noted 1/again 4/

2020, noted a 3 x 2 cm hypoechoic focus seen within the left adnexa abutting 

the ovary.  This finding is difficult to separate from the ovary itself 

questionable ectopic pregnancy versus complex exophytic cyst. 


Case discussed with Dr. Candelaria OB/GYN on call.  He is recommending 

methotrexate along with follow-up with the health care clinic on Thursday.  

Patient is well aware if symptoms worsen to return to the emergency room 

immediately.





Discharge





- Discharge Information


Problems reviewed: Yes


Clinical Impression/Diagnosis: 


 Ectopic pregnancy





Condition: Good





- Follow up/Referral


Referrals: 


Danny Raygoza MD [Staff Physician] - 





- Patient Discharge Instructions


Patient Printed Discharge Instructions:  [facility] for Ectopic Pregnancy


Additional Instructions: 


 At this time you did recieve methotrexate today and be aware you may have 

worsening abdominal cramping and vaginal bleeding but your symptoms should not 

be severe and feel similar to your previous methotrexate post administration 

symptoms.  


If you do develop worsening symptoms please go to the nearest ER.


Otherwise follow-up at to [facility] as discussed and listed on your referral 

discharge paperwork





- Post Discharge Activity

## 2020-01-06 NOTE — PDOC
Rapid Medical Evaluation


Medical Evaluation: 


 Allergies











Allergy/AdvReac Type Severity Reaction Status Date / Time


 


No Known Allergies Allergy   Verified 01/04/20 14:59











I have performed a brief in-person evaluation of this patient.


The patient presents with a chief complaint of: is pregnant; returns to ED for 

repeat bhcg and TVUS; seen 2 days ago and unable to visualize IUP; 2 cm mass 

noted along L adnexa; has hx 2 ectopics in the past; denies vag bleeding


Pertinent physical exam findings: In NAD


I have ordered the following: Bhcg, TVUS


The patient will proceed to the ED for further evaluation.





01/06/20 16:15

## 2021-05-10 ENCOUNTER — HOSPITAL ENCOUNTER (EMERGENCY)
Dept: HOSPITAL 74 - FER | Age: 26
LOS: 1 days | Discharge: HOME | End: 2021-05-11
Payer: COMMERCIAL

## 2021-05-10 VITALS — SYSTOLIC BLOOD PRESSURE: 119 MMHG | HEART RATE: 64 BPM | DIASTOLIC BLOOD PRESSURE: 64 MMHG | TEMPERATURE: 97.9 F

## 2021-05-10 VITALS — BODY MASS INDEX: 36.3 KG/M2

## 2021-05-10 DIAGNOSIS — L50.9: Primary | ICD-10-CM
